# Patient Record
Sex: MALE | Race: WHITE | NOT HISPANIC OR LATINO | Employment: OTHER | URBAN - METROPOLITAN AREA
[De-identification: names, ages, dates, MRNs, and addresses within clinical notes are randomized per-mention and may not be internally consistent; named-entity substitution may affect disease eponyms.]

---

## 2017-01-26 ENCOUNTER — OFFICE VISIT (OUTPATIENT)
Dept: MEDICAL GROUP | Facility: MEDICAL CENTER | Age: 57
End: 2017-01-26
Payer: MEDICARE

## 2017-01-26 ENCOUNTER — TELEPHONE (OUTPATIENT)
Dept: MEDICAL GROUP | Facility: MEDICAL CENTER | Age: 57
End: 2017-01-26

## 2017-01-26 VITALS
DIASTOLIC BLOOD PRESSURE: 76 MMHG | HEIGHT: 71 IN | WEIGHT: 147 LBS | SYSTOLIC BLOOD PRESSURE: 120 MMHG | HEART RATE: 78 BPM | BODY MASS INDEX: 20.58 KG/M2 | OXYGEN SATURATION: 97 % | RESPIRATION RATE: 16 BRPM | TEMPERATURE: 98.8 F

## 2017-01-26 DIAGNOSIS — Z12.11 SCREEN FOR COLON CANCER: ICD-10-CM

## 2017-01-26 DIAGNOSIS — R06.02 SOB (SHORTNESS OF BREATH): ICD-10-CM

## 2017-01-26 DIAGNOSIS — J30.89 ALLERGIC RHINITIS DUE TO OTHER ALLERGIC TRIGGER, UNSPECIFIED RHINITIS SEASONALITY: ICD-10-CM

## 2017-01-26 DIAGNOSIS — M79.675 PAIN OF TOE OF LEFT FOOT: ICD-10-CM

## 2017-01-26 DIAGNOSIS — M54.2 NECK PAIN: ICD-10-CM

## 2017-01-26 PROCEDURE — 4004F PT TOBACCO SCREEN RCVD TLK: CPT | Performed by: NURSE PRACTITIONER

## 2017-01-26 PROCEDURE — 3017F COLORECTAL CA SCREEN DOC REV: CPT | Mod: 1P | Performed by: NURSE PRACTITIONER

## 2017-01-26 PROCEDURE — G8420 CALC BMI NORM PARAMETERS: HCPCS | Performed by: NURSE PRACTITIONER

## 2017-01-26 PROCEDURE — G8484 FLU IMMUNIZE NO ADMIN: HCPCS | Performed by: NURSE PRACTITIONER

## 2017-01-26 PROCEDURE — G8432 DEP SCR NOT DOC, RNG: HCPCS | Performed by: NURSE PRACTITIONER

## 2017-01-26 PROCEDURE — 99214 OFFICE O/P EST MOD 30 MIN: CPT | Performed by: NURSE PRACTITIONER

## 2017-01-26 RX ORDER — HYDROCODONE BITARTRATE AND ACETAMINOPHEN 5; 325 MG/1; MG/1
1 TABLET ORAL EVERY 8 HOURS PRN
Qty: 20 TAB | Refills: 0 | Status: SHIPPED | OUTPATIENT
Start: 2017-01-26 | End: 2018-12-11

## 2017-01-26 RX ORDER — FLUTICASONE PROPIONATE 50 MCG
2 SPRAY, SUSPENSION (ML) NASAL DAILY
Qty: 16 G | Refills: 1 | Status: SHIPPED | OUTPATIENT
Start: 2017-01-26

## 2017-01-26 ASSESSMENT — ENCOUNTER SYMPTOMS
NECK PAIN: 1
SHORTNESS OF BREATH: 1

## 2017-01-26 NOTE — TELEPHONE ENCOUNTER
MEDICATION PRIOR AUTHORIZATION NEEDED:    1. Name of Medication: Dulera    2. Requested By (Name of Pharmacy): [facility] [address] [phone]     3. Is [place] current[place] [facility]    4. What is the name & phone number of the 3rd party payor? Done on cover my meds Reference # [id number]

## 2017-01-26 NOTE — PROGRESS NOTES
Subjective:      Lamin Pickens is a 56 y.o. male who presents with Follow-Up            HPI Lamin Pickens is here today for toe pain as well as refills on medication and referral.      1. Pain of toe of left foot  Patient states he has had pain in his left large toe on and off for quite a while. He states he tried to contact the podiatry office and get an appointment but states it was a long wait and therefore did not make the appointment. Pain tends to come and go. He admits there is a callus on the side of the foot but there is no swelling or redness noted.    2. Allergic rhinitis due to other allergic trigger, unspecified rhinitis seasonality  Patient states he tends to have nasal congestion frequently throughout the years and might have allergies. He is not currently using anything for his nasal congestion or rhinorrhea.    3. SOB (shortness of breath)  Patient would like a refill on his Dulera and albuterol. His shortness of breath is most likely related to his smoking which he refuses to quit. He states the preventative inhaler is helpful.    4. Neck pain  Patient states he has been having back pain on and off mostly on the right side for a few months. He would like to see a physical therapist for this as well as get a refill on Norco. He states he uses the medicines infrequently. He had been on regular narcotic medications over a year ago but now only uses them infrequently.    5. Screen for colon cancer  Patient states he has never did a colonoscopy.    Current Outpatient Prescriptions   Medication Sig Dispense Refill   • hydrocodone-acetaminophen (NORCO) 5-325 MG Tab per tablet Take 1 Tab by mouth every 8 hours as needed. 20 Tab 0   • fluticasone (FLONASE) 50 MCG/ACT nasal spray Spray 2 Sprays in nose every day. 16 g 1   • Mometasone Furo-Formoterol Fum (DULERA) 200-5 MCG/ACT Aerosol Inhale 2 Puffs by mouth 2 Times a Day. 1 Inhaler 5   • albuterol 108 (90 BASE) MCG/ACT Aero Soln inhalation aerosol Inhale 2  "Puffs by mouth every 6 hours as needed for Shortness of Breath. 8.5 g 3     No current facility-administered medications for this visit.     Social History   Substance Use Topics   • Smoking status: Current Every Day Smoker -- 0.50 packs/day     Types: Cigarettes   • Smokeless tobacco: Never Used   • Alcohol Use: 0.0 oz/week     0 Standard drinks or equivalent per week      Comment: very rare     Past Medical History   Diagnosis Date   • Pain    • Chronic back pain      Family History   Problem Relation Age of Onset   • Other Mother      healthy   • Other Father      unknown       Review of Systems   Respiratory: Positive for shortness of breath.    Musculoskeletal: Positive for joint pain and neck pain.   All other systems reviewed and are negative.         Objective:     /76 mmHg  Pulse 78  Temp(Src) 37.1 °C (98.8 °F)  Resp 16  Ht 1.803 m (5' 11\")  Wt 66.679 kg (147 lb)  BMI 20.51 kg/m2  SpO2 97%     Physical Exam   Constitutional: He is oriented to person, place, and time. He appears well-developed and well-nourished. No distress.   HENT:   Head: Normocephalic and atraumatic.   Right Ear: External ear normal.   Left Ear: External ear normal.   Nose: Nose normal.   Mouth/Throat: Oropharynx is clear and moist.   Eyes: Conjunctivae are normal. Right eye exhibits no discharge. Left eye exhibits no discharge.   Neck: Normal range of motion. Neck supple. No tracheal deviation present. No thyromegaly present.   Cardiovascular: Normal rate, regular rhythm and normal heart sounds.    No murmur heard.  Pulmonary/Chest: Effort normal and breath sounds normal. No respiratory distress. He has no wheezes. He has no rales.   Musculoskeletal:        Cervical back: He exhibits decreased range of motion and pain.   Lymphadenopathy:     He has no cervical adenopathy.   Neurological: He is alert and oriented to person, place, and time. Coordination normal.   Skin: Skin is warm and dry. No rash noted. He is not " diaphoretic. No erythema.   No redness or swelling noted of the left large toe although he states it is tender to touch and there is callus and the side of the toe.   Psychiatric: He has a normal mood and affect. His behavior is normal. Judgment and thought content normal.   Nursing note and vitals reviewed.              Assessment/Plan:     1. Pain of toe of left foot    - REFERRAL TO PODIATRY    2. Allergic rhinitis due to other allergic trigger, unspecified rhinitis seasonality    - fluticasone (FLONASE) 50 MCG/ACT nasal spray; Spray 2 Sprays in nose every day.  Dispense: 16 g; Refill: 1    3. SOB (shortness of breath)  I have refilled patient's Dulera and recommend that he have pulmonary function test in the future. I also recommended quitting smoking.  - Mometasone Furo-Formoterol Fum (DULERA) 200-5 MCG/ACT Aerosol; Inhale 2 Puffs by mouth 2 Times a Day.  Dispense: 1 Inhaler; Refill: 5    4. Neck pain  I gave patient a short course of Norco but explained that cannot prescribe this on a regular basis. He will go to physical therapy and if he continues to have problems I recommended a physiatry referral.  - hydrocodone-acetaminophen (NORCO) 5-325 MG Tab per tablet; Take 1 Tab by mouth every 8 hours as needed.  Dispense: 20 Tab; Refill: 0  - REFERRAL TO PHYSICAL THERAPY Reason for Therapy: Eval/Treat/Report    5. Screen for colon cancer    - REFERRAL TO GI FOR COLONOSCOPY

## 2017-01-26 NOTE — MR AVS SNAPSHOT
"        Lamin Pickens   2017 9:40 AM   Office Visit   MRN: [id number]    Department:  [address] [place] [address]   Dept Phone:  [phone]    Description:  Male : 1960   Provider:  JANETH Hoffman           Reason for Visit     Follow-Up           Allergies as of 2017     Allergen Noted Reactions    Nkda [No Known Drug Allergy] 11/10/2007         You were diagnosed with     Pain of toe of left foot   [345265]       Allergic rhinitis due to other allergic trigger, unspecified rhinitis seasonality   [9379420]       SOB (shortness of breath)   [926341]       Neck pain   [142674]       Screen for colon cancer   [[id number]]         Vital Signs     Blood Pressure Pulse Temperature Respirations Height Weight    120/76 mmHg 78 37.1 °C (98.8 °F) 16 1.803 m (5' 11\") 66.679 kg (147 lb)    Body Mass Index Oxygen Saturation Smoking Status             20.51 kg/m2 97% Current Every Day Smoker         Basic Information     Date Of Birth Sex Race Ethnicity Preferred Language    1960 Male White Non- English      Problem List              ICD-10-CM Priority Class Noted - Resolved    Chronic back pain M54.9, G89.29   2016 - Present    Tobacco abuse Z72.0   2016 - Present      Health Maintenance        Date Due Completion Dates    IMM PNEUMOCOCCAL 19-64 (ADULT) MEDIUM RISK SERIES (1 of 1 - PPSV23) 1977 ---    COLONOSCOPY 2008 ---    IMM DTaP/Tdap/Td Vaccine (1 - Tdap) 2010    IMM INFLUENZA (1) 2016 ---            Current Immunizations     TD Vaccine 2010  1:19 PM      Below and/or attached are the medications your provider expects you to take. Review all of your home medications and newly ordered medications with your provider and/or pharmacist. Follow medication instructions as directed by your provider and/or pharmacist. Please keep your medication list with you and share with your provider. Update the information when medications are discontinued, doses are changed, " or new medications (including over-the-counter products) are added; and carry medication information at all times in the event of emergency situations     Allergies:  NKDA - (reactions not documented)               Medications  Valid as of: January 26, 2017 - 12:45 PM    Generic Name Brand Name Tablet Size Instructions for use    Albuterol Sulfate (Aero Soln) albuterol 108 (90 BASE) MCG/ACT Inhale 2 Puffs by mouth every 6 hours as needed for Shortness of Breath.        Fluticasone Propionate (Suspension) FLONASE 50 MCG/ACT Spray 2 Sprays in nose every day.        Hydrocodone-Acetaminophen (Tab) NORCO 5-325 MG Take 1 Tab by mouth every 8 hours as needed.        Mometasone Furo-Formoterol Fum (Aerosol) Mometasone Furo-Formoterol Fum 200-5 MCG/ACT Inhale 2 Puffs by mouth 2 Times a Day.        .                 Medicines prescribed today were sent to:     [id number] - [place], [place] - [address] [place] [address]    Phone: [phone] Fax: [phone]    Open 24 Hours?: No      Medication refill instructions:       If your prescription bottle indicates you have medication refills left, it is not necessary to call your provider’s office. Please contact your pharmacy and they will refill your medication.    If your prescription bottle indicates you do not have any refills left, you may request refills at any time through one of the following ways: The online [employer] system (except Urgent Care), by calling your provider’s office, or by asking your pharmacy to contact your provider’s office with a refill request. Medication refills are processed only during regular business hours and may not be available until the next business day. Your provider may request additional information or to have a follow-up visit with you prior to refilling your medication.   *Please Note: Medication refills are assigned a new Rx number when refilled electronically. Your pharmacy may indicate that no refills  were authorized even though a new prescription for the same medication is available at the pharmacy. Please request the medicine by name with the pharmacy before contacting your provider for a refill.        Referral     A referral request has been sent to our patient care coordination department. Please allow 3-5 business days for us to process this request and contact you either by phone or mail. If you do not hear from us by the 5th business day, please call us at [phone].           Gidsy Access Code: [email or web address]  Expires: 2/10/2017  3:47 PM    Gidsy  A secure, online tool to manage your health information     [employer]’s Gidsy® is a secure, online tool that connects you to your personalized health information from the privacy of your home -- day or night - making it very easy for you to manage your healthcare. Once the activation process is completed, you can even access your medical information using the Gidsy jada, which is available for free in the Apple Jada store or Google Play store.     Gidsy provides the following levels of access (as shown below):   My Chart Features   [facility] Doctor [facility]  Specialists [facility]  Urgent  Care Non-[facility]  Primary Care  Doctor   Email your healthcare team securely and privately 24/7 X X X    Manage appointments: schedule your next appointment; view details of past/upcoming appointments X      Request prescription refills. X      View recent personal medical records, including lab and immunizations X X X X   View health record, including health history, allergies, medications X X X X   Read reports about your outpatient visits, procedures, consult and ER notes X X X X   See your discharge summary, which is a recap of your hospital and/or ER visit that includes your diagnosis, lab results, and care plan. X X       How to register for Gidsy:  1. Go to  [email or web address].  2. Click on the Sign Up Now box, which takes you to the  New Member Sign Up page. You will need to provide the following information:  a. Enter your Anc[employer] Access Code exactly as it appears at the top of this page. (You will not need to use this code after you’ve completed the sign-up process. If you do not sign up before the expiration date, you must request a new code.)   b. Enter your date of birth.   c. Enter your home email address.   d. Click Submit, and follow the next screen’s instructions.  3. Create a Ancanco ID. This will be your Ancanco login ID and cannot be changed, so think of one that is secure and easy to remember.  4. Create a Ancanco password. You can change your password at any time.  5. Enter your Password Reset Question and Answer. This can be used at a later time if you forget your password.   6. Enter your e-mail address. This allows you to receive e-mail notifications when new information is available in Ancanco.  7. Click Sign Up. You can now view your health information.    For assistance activating your Anc[employer] account, call [phone]

## 2017-01-31 ENCOUNTER — TELEPHONE (OUTPATIENT)
Dept: MEDICAL GROUP | Facility: MEDICAL CENTER | Age: 57
End: 2017-01-31

## 2017-01-31 DIAGNOSIS — J43.9 PULMONARY EMPHYSEMA, UNSPECIFIED EMPHYSEMA TYPE (HCC): ICD-10-CM

## 2017-01-31 NOTE — TELEPHONE ENCOUNTER
1. Caller Name: Pharmacy                                            Patient approves a detailed voicemail message: N\A    Patient's insurance did not covered the prescription for Symbicort anymore, they would like you to prescribe pt Breo

## 2017-02-15 ENCOUNTER — APPOINTMENT (OUTPATIENT)
Dept: PHYSICAL THERAPY | Facility: REHABILITATION | Age: 57
End: 2017-02-15
Attending: NURSE PRACTITIONER
Payer: MEDICARE

## 2017-02-20 ENCOUNTER — APPOINTMENT (OUTPATIENT)
Dept: PHYSICAL THERAPY | Facility: REHABILITATION | Age: 57
End: 2017-02-20
Attending: NURSE PRACTITIONER
Payer: MEDICARE

## 2017-02-22 ENCOUNTER — APPOINTMENT (OUTPATIENT)
Dept: PHYSICAL THERAPY | Facility: REHABILITATION | Age: 57
End: 2017-02-22
Attending: NURSE PRACTITIONER
Payer: MEDICARE

## 2017-02-27 ENCOUNTER — APPOINTMENT (OUTPATIENT)
Dept: PHYSICAL THERAPY | Facility: REHABILITATION | Age: 57
End: 2017-02-27
Attending: NURSE PRACTITIONER
Payer: MEDICARE

## 2017-03-01 ENCOUNTER — APPOINTMENT (OUTPATIENT)
Dept: PHYSICAL THERAPY | Facility: REHABILITATION | Age: 57
End: 2017-03-01
Attending: NURSE PRACTITIONER
Payer: MEDICARE

## 2017-03-06 ENCOUNTER — APPOINTMENT (OUTPATIENT)
Dept: PHYSICAL THERAPY | Facility: REHABILITATION | Age: 57
End: 2017-03-06
Attending: NURSE PRACTITIONER
Payer: MEDICARE

## 2017-03-08 ENCOUNTER — APPOINTMENT (OUTPATIENT)
Dept: PHYSICAL THERAPY | Facility: REHABILITATION | Age: 57
End: 2017-03-08
Attending: NURSE PRACTITIONER
Payer: MEDICARE

## 2018-11-26 ENCOUNTER — HOSPITAL ENCOUNTER (EMERGENCY)
Facility: MEDICAL CENTER | Age: 58
End: 2018-11-26
Attending: EMERGENCY MEDICINE
Payer: MEDICARE

## 2018-11-26 ENCOUNTER — HOSPITAL ENCOUNTER (EMERGENCY)
Facility: MEDICAL CENTER | Age: 58
End: 2018-11-26
Payer: MEDICARE

## 2018-11-26 ENCOUNTER — APPOINTMENT (OUTPATIENT)
Dept: RADIOLOGY | Facility: MEDICAL CENTER | Age: 58
End: 2018-11-26
Attending: EMERGENCY MEDICINE
Payer: MEDICARE

## 2018-11-26 VITALS
SYSTOLIC BLOOD PRESSURE: 100 MMHG | WEIGHT: 142 LBS | RESPIRATION RATE: 16 BRPM | HEART RATE: 77 BPM | DIASTOLIC BLOOD PRESSURE: 76 MMHG | BODY MASS INDEX: 19.8 KG/M2 | TEMPERATURE: 100.2 F | OXYGEN SATURATION: 93 %

## 2018-11-26 VITALS
OXYGEN SATURATION: 91 % | DIASTOLIC BLOOD PRESSURE: 90 MMHG | TEMPERATURE: 97.7 F | SYSTOLIC BLOOD PRESSURE: 160 MMHG | RESPIRATION RATE: 18 BRPM | HEART RATE: 88 BPM | HEIGHT: 71 IN | BODY MASS INDEX: 20 KG/M2 | WEIGHT: 142.86 LBS

## 2018-11-26 DIAGNOSIS — J18.9 ATYPICAL PNEUMONIA: ICD-10-CM

## 2018-11-26 PROCEDURE — 302449 STATCHG TRIAGE ONLY (STATISTIC)

## 2018-11-26 PROCEDURE — 700111 HCHG RX REV CODE 636 W/ 250 OVERRIDE (IP): Mod: EDC | Performed by: EMERGENCY MEDICINE

## 2018-11-26 PROCEDURE — 700102 HCHG RX REV CODE 250 W/ 637 OVERRIDE(OP): Mod: EDC | Performed by: EMERGENCY MEDICINE

## 2018-11-26 PROCEDURE — 71046 X-RAY EXAM CHEST 2 VIEWS: CPT

## 2018-11-26 PROCEDURE — 99284 EMERGENCY DEPT VISIT MOD MDM: CPT | Mod: EDC

## 2018-11-26 PROCEDURE — A9270 NON-COVERED ITEM OR SERVICE: HCPCS | Mod: EDC | Performed by: EMERGENCY MEDICINE

## 2018-11-26 RX ORDER — ALBUTEROL SULFATE 90 UG/1
2 AEROSOL, METERED RESPIRATORY (INHALATION) ONCE
Status: COMPLETED | OUTPATIENT
Start: 2018-11-26 | End: 2018-11-26

## 2018-11-26 RX ORDER — AZITHROMYCIN 250 MG/1
TABLET, FILM COATED ORAL
Qty: 6 TAB | Refills: 0 | Status: SHIPPED | OUTPATIENT
Start: 2018-11-26 | End: 2018-12-11

## 2018-11-26 RX ORDER — PREDNISONE 20 MG/1
40 TABLET ORAL DAILY
Qty: 8 TAB | Refills: 0 | Status: SHIPPED | OUTPATIENT
Start: 2018-11-26 | End: 2018-11-26

## 2018-11-26 RX ORDER — PREDNISONE 20 MG/1
60 TABLET ORAL DAILY
Status: DISCONTINUED | OUTPATIENT
Start: 2018-11-26 | End: 2018-11-26 | Stop reason: HOSPADM

## 2018-11-26 RX ORDER — PREDNISONE 20 MG/1
40 TABLET ORAL DAILY
Qty: 8 TAB | Refills: 0 | Status: SHIPPED | OUTPATIENT
Start: 2018-11-26 | End: 2018-11-30

## 2018-11-26 RX ADMIN — ALBUTEROL SULFATE 2 PUFF: 90 AEROSOL, METERED RESPIRATORY (INHALATION) at 02:47

## 2018-11-26 RX ADMIN — PREDNISONE 60 MG: 20 TABLET ORAL at 02:06

## 2018-11-26 ASSESSMENT — PAIN SCALES - GENERAL: PAINLEVEL_OUTOF10: 0

## 2018-11-26 NOTE — ED NOTES
Lamin DECKER/Judith.  Discharge instructions including the importance of hydration, the use of OTC medications, information on community acquired pneumonia and the proper follow up recommendations have been provided to the pt.  Pt states understanding.  Pt states all questions have been answered.  A copy of the discharge instructions have been provided to pt.  A signed copy is in the chart.  Prescription for zithromax and prednisone provided to pt. Discussed worsening symptoms to return to ED, importance of fluids, humidifier for cough, follow up with pcp for recheck.   Pt ambulated out of department in NAD. /76   Pulse 77   Temp 37.9 °C (100.2 °F) (Temporal)   Resp 16   Wt 64.4 kg (142 lb)   SpO2 93%   BMI 19.80 kg/m²

## 2018-11-26 NOTE — ED TRIAGE NOTES
"Chief Complaint   Patient presents with   • Flu Like Symptoms     Patient ambulatory to triage. Patient states that he has \"the flu\". Patient c/o of productive cough, congestion, and chills. Patient states that he is coughing up green mucous. Also reports that he has only been able to eat a tub of ice cream in the past few days. /90   Pulse 88   Temp 36.5 °C (97.7 °F) (Temporal)   Resp 18   Ht 1.803 m (5' 11\")   Wt 64.8 kg (142 lb 13.7 oz)   SpO2 91%   BMI 19.92 kg/m²     "

## 2018-11-26 NOTE — ED NOTES
Patient ambulatory to peds 50 with family.  Triage note reviewed and agreed with.  Patient is awake, alert and appropriate with no obvious S/S of distress or discomfort.  Chart up for ERP.

## 2018-11-26 NOTE — ED NOTES
RT is aware of the need for an albuterol inhaler.  Patient is aware of the need for x-rays.  Will continue to assess.

## 2018-11-26 NOTE — ED TRIAGE NOTES
"Chief Complaint   Patient presents with   • Flu Like Symptoms     Patient ambulatory to triage. Patient states that he has \"the flu\". Patient c/o of productive cough, congestion, and chills. Patient states that he is coughing up green mucous. Also reports that he has only been able to eat a tub of ice cream in the past few days. /90   Pulse 88   Temp 36.5 °C (97.7 °F) (Temporal)   Resp 16   Wt 64.4 kg (142 lb)   SpO2 91%   BMI 19.80 kg/m²       "

## 2018-11-26 NOTE — ED PROVIDER NOTES
ER Provider Note     Scribed for Salty Phelan M.D. by Linda Masterson. 11/26/2018, 1:49 AM.    Primary Care Provider: JANETH Hoffman  Means of Arrival: Walk in   History obtained from: Patient  History limited by: None     CHIEF COMPLAINT  Chief Complaint   Patient presents with   • Flu Like Symptoms       HPI  Lamin Pickens is a 58 y.o. male with history of COPD who presents to the Emergency Department for evaluation of a productive cough with associated green sputum production and nasal congestion onset a few days ago that has gradually worsened in severity. He states he has only been able to tolerate ice cream. The patient has been using an inhaler with mild alleviation. He has been diagnosed with COPD exacerbation in the past, but recently quit smoking 9 days ago. The patient denies fever. No alleviating or exacerbating factors are identified at this time.     REVIEW OF SYSTEMS  See HPI for further details.     PAST MEDICAL HISTORY   has a past medical history of Chronic back pain and Pain.    SURGICAL HISTORY  patient denies any surgical history    SOCIAL HISTORY  Social History   Substance Use Topics   • Smoking status: Former Smoker     Packs/day: 0.50     Types: Cigarettes   • Smokeless tobacco: Never Used   • Alcohol use 0.0 oz/week      Comment: very rare      History   Drug Use No       FAMILY HISTORY  Family History   Problem Relation Age of Onset   • Other Mother         healthy   • Other Father         unknown       CURRENT MEDICATIONS  Home Medications     Reviewed by Delphine Hinojosa R.N. (Registered Nurse) on 11/26/18 at 0121  Med List Status: Partial   Medication Last Dose Status   albuterol 108 (90 BASE) MCG/ACT Aero Soln inhalation aerosol  Active   fluticasone (FLONASE) 50 MCG/ACT nasal spray  Active   Fluticasone Furoate-Vilanterol (BREO ELLIPTA) 200-25 MCG/INH AEROSOL POWDER, BREATH ACTIVATED  Active   hydrocodone-acetaminophen (NORCO) 5-325 MG Tab per tablet  Active   Mometasone  Furo-Formoterol Fum (DULERA) 200-5 MCG/ACT Aerosol  Active                ALLERGIES  Allergies   Allergen Reactions   • Nkda [No Known Drug Allergy]        PHYSICAL EXAM  VITAL SIGNS: /90   Pulse 88   Temp 36.5 °C (97.7 °F) (Temporal)   Resp 16   Wt 64.4 kg (142 lb)   SpO2 91%   BMI 19.80 kg/m²      Constitutional: Alert in no apparent distress.  HENT: Normocephalic, Atraumatic, Bilateral external ears normal. Nose normal.   Eyes: Pupils are equal and reactive. Conjunctiva normal, non-icteric.   Heart: Regular rate and rythm, no murmurs.    Lungs: Scant wheezing throughout, occasional rhonchi. Wet cough.   Skin: Warm, Dry, No erythema, No rash.   Neurologic: Alert, Grossly non-focal.   Psychiatric: Affect normal, Judgment normal, Mood normal, Appears appropriate and not intoxicated.     RADIOLOGY  DX-CHEST-2 VIEWS   Final Result      No radiographic evidence of acute cardiopulmonary process.        The radiologist's interpretation of all radiological studies have been reviewed by me.    COURSE & MEDICAL DECISION MAKING  Pertinent Labs & Imaging studies reviewed. (See chart for details)    This is a 58 y.o. male that presents with what appears to be a COPD exacerbation.  The patient is not in extremis at this time.  I will give him an albuterol inhaler treatment as well as given steroids.  I get a chest x-ray to evaluate for pneumonia and then reassess.  I do not believe the patient is having acute coronary syndrome.      1:49 AM - Patient seen and examined at bedside. Ordered DX-Chest.  Patient will be medicated with Deltasone 60 mg and Albuterol Inhaler 2 puffs for his symptoms.     3:35 AM - Reviewed patient's radiology results as shown above.     3:36 AM - Patient reevaluated. The patient is resting comfortably with stable vital signs. He was updated with radiology results that are reassuring with no focal pneumonia.  I believe this is likely an atypical pneumonia.. Patient informed he will be  discharged home with prescriptions for Zithromax and Deltasone. He is understanding and agreeable to discharge at this time.     The patient will return for new or worsening symptoms and is stable at the time of discharge.    DISPOSITION:  Patient will be discharged home in stable condition.    FOLLOW UP:  JANETH Hoffman  [address] [facility] [address]  [place] [address]  [phone]            OUTPATIENT MEDICATIONS:  New Prescriptions    AZITHROMYCIN (ZITHROMAX) 250 MG TAB    Take two tabs by mouth on day one, then one tab by mouth daily on days 2-5.    PREDNISONE (DELTASONE) 20 MG TAB    Take 2 Tabs by mouth every day for 4 days.       FINAL IMPRESSION  1. Atypical pneumonia          Linda LENTZ (Nori), am scribing for, and in the presence of, Salty Phelan M.D..    Electronically signed by: Linda Arteaga), 11/26/2018    Salty LENTZ M.D. personally performed the services described in this documentation, as scribed by Linda Masterson in my presence, and it is both accurate and complete.     E.    The note accurately reflects work and decisions made by me.  Salty Phelan  11/26/2018  4:53 AM

## 2018-12-11 ENCOUNTER — OFFICE VISIT (OUTPATIENT)
Dept: MEDICAL GROUP | Facility: MEDICAL CENTER | Age: 58
End: 2018-12-11
Payer: MEDICARE

## 2018-12-11 VITALS
DIASTOLIC BLOOD PRESSURE: 70 MMHG | SYSTOLIC BLOOD PRESSURE: 104 MMHG | HEART RATE: 70 BPM | TEMPERATURE: 98.7 F | WEIGHT: 143 LBS | OXYGEN SATURATION: 98 % | BODY MASS INDEX: 20.02 KG/M2 | HEIGHT: 71 IN | RESPIRATION RATE: 16 BRPM

## 2018-12-11 DIAGNOSIS — J44.1 COPD EXACERBATION (HCC): ICD-10-CM

## 2018-12-11 DIAGNOSIS — Z12.5 SCREENING FOR PROSTATE CANCER: ICD-10-CM

## 2018-12-11 DIAGNOSIS — Z72.0 TOBACCO ABUSE: ICD-10-CM

## 2018-12-11 DIAGNOSIS — R53.83 FATIGUE, UNSPECIFIED TYPE: ICD-10-CM

## 2018-12-11 DIAGNOSIS — J43.9 PULMONARY EMPHYSEMA, UNSPECIFIED EMPHYSEMA TYPE (HCC): ICD-10-CM

## 2018-12-11 DIAGNOSIS — Z23 INFLUENZA VACCINE NEEDED: ICD-10-CM

## 2018-12-11 DIAGNOSIS — Z13.220 SCREENING CHOLESTEROL LEVEL: ICD-10-CM

## 2018-12-11 PROCEDURE — 99214 OFFICE O/P EST MOD 30 MIN: CPT | Mod: 25 | Performed by: NURSE PRACTITIONER

## 2018-12-11 PROCEDURE — G0008 ADMIN INFLUENZA VIRUS VAC: HCPCS | Performed by: NURSE PRACTITIONER

## 2018-12-11 PROCEDURE — 90686 IIV4 VACC NO PRSV 0.5 ML IM: CPT | Performed by: NURSE PRACTITIONER

## 2018-12-11 RX ORDER — TIOTROPIUM BROMIDE 18 UG/1
18 CAPSULE ORAL; RESPIRATORY (INHALATION) DAILY
Qty: 30 CAP | Refills: 3 | Status: SHIPPED | OUTPATIENT
Start: 2018-12-11

## 2018-12-11 ASSESSMENT — ENCOUNTER SYMPTOMS
SPUTUM PRODUCTION: 1
SHORTNESS OF BREATH: 1
COUGH: 1

## 2018-12-11 ASSESSMENT — PATIENT HEALTH QUESTIONNAIRE - PHQ9: CLINICAL INTERPRETATION OF PHQ2 SCORE: 0

## 2018-12-11 NOTE — PROGRESS NOTES
Subjective:      Lamin Pickens is a 58 y.o. male who presents with Follow-Up (woudl like labwork lossing weight)        CC: Patient is here today accompanied by his 2 sons for emergency room follow-up with COPD exacerbation as well as issues with fatigue and breathing.  Patient's last office visit was January 2017.    HPI Lamin Pickens        1. COPD exacerbation (HCC)  Patient was seen at the emergency room for complaints of shortness of breath and green sputum.  His subsequent chest x-ray was normal with stable hyperinflation but he was treated for possible community-acquired pneumonia with steroids and antibiotics.  He states he still has some issues with shortness of breath for which he finds relief with albuterol.  He did quit smoking about 25 days ago but smoked for many years.    2. Pulmonary emphysema, unspecified emphysema type (HCC)  Patient currently only using albuterol because he states the preventative inhalers that were prescribed in the past were too expensive.  He also was smoking cigarettes up until recently.  He did have a pulmonary function test in September 2016 which suggested moderate obstructive lung disease with possibility of restrictive lung disease.    3. Fatigue, unspecified type  Patient states he tends to feel more tired than usual for the past few months.  He also thinks he has been losing weight although looking at his weight over the last 2 years shows he has fluctuated in the 140s range and does not show excessive weight loss.  He has not done lab work since 2016 at which time he had a normal chemistry panel and CBC.      4. Tobacco abuse  Patient continues to abstain from tobacco for the last 25 days but was a long-term tobacco user.  He has COPD but no evidence of infection or masses on his recent chest x-ray.    5. Screening for prostate cancer  Patient overdue for PSA testing with his last test in 2016.    6. Screening cholesterol level  Last cholesterol testing was in 2016 and was  "good.    7. Influenza vaccine needed  Patient due for this season's flu vaccine.  Current Outpatient Prescriptions   Medication Sig Dispense Refill   • tiotropium (SPIRIVA) 18 MCG Cap Inhale 1 Cap by mouth every day. 30 Cap 3   • albuterol 108 (90 BASE) MCG/ACT Aero Soln inhalation aerosol Inhale 2 Puffs by mouth every 6 hours as needed for Shortness of Breath. 8.5 g 3   • fluticasone (FLONASE) 50 MCG/ACT nasal spray Spray 2 Sprays in nose every day. 16 g 1     No current facility-administered medications for this visit.      Social History   Substance Use Topics   • Smoking status: Former Smoker     Packs/day: 0.50     Types: Cigarettes   • Smokeless tobacco: Never Used   • Alcohol use 0.0 oz/week      Comment: very rare     Family History   Problem Relation Age of Onset   • Other Mother         healthy   • Other Father         unknown     Past Medical History:   Diagnosis Date   • Chronic back pain    • Pain        Review of Systems   Constitutional: Positive for malaise/fatigue.   Respiratory: Positive for cough, sputum production and shortness of breath.    All other systems reviewed and are negative.         Objective:     /70 (BP Location: Right arm, Patient Position: Sitting, BP Cuff Size: Adult)   Pulse 70   Temp 37.1 °C (98.7 °F) (Temporal)   Resp 16   Ht 1.803 m (5' 11\")   Wt 64.9 kg (143 lb)   SpO2 98%   BMI 19.94 kg/m²      Physical Exam   Constitutional: He is oriented to person, place, and time. He appears well-developed and well-nourished. No distress.   HENT:   Head: Normocephalic and atraumatic.   Right Ear: External ear normal.   Left Ear: External ear normal.   Nose: Nose normal.   Mouth/Throat: Oropharynx is clear and moist.   Eyes: Conjunctivae are normal. Right eye exhibits no discharge. Left eye exhibits no discharge.   Neck: Normal range of motion. Neck supple. No tracheal deviation present. No thyromegaly present.   Cardiovascular: Normal rate, regular rhythm and normal heart " sounds.    No murmur heard.  Pulmonary/Chest: Effort normal. No respiratory distress. He has decreased breath sounds. He has no wheezes. He has no rales.   Lymphadenopathy:     He has no cervical adenopathy.   Neurological: He is alert and oriented to person, place, and time. Coordination normal.   Skin: Skin is warm and dry. No rash noted. He is not diaphoretic. No erythema.   Psychiatric: He has a normal mood and affect. His behavior is normal. Judgment and thought content normal.   Nursing note and vitals reviewed.              Assessment/Plan:     1. COPD exacerbation (HCC)  Patient recently treated for COPD exacerbation but continues to complain of expectoration of phlegm and shortness of breath despite antibiotics and steroids.  I would like to get a CT of the chest and he will be referred to pulmonology.  - CT-CHEST (THORAX) W/O; Future  - REFERRAL TO PULMONOLOGY    2. Pulmonary emphysema, unspecified emphysema type ([facility])  Last pulmonary function test was in 2016 showing moderate disease.  I would like to get a repeat PFT and I explained that he needs to be on preventative inhalers as well.  I will try him on Spiriva to start and hopefully can afford this.  He has quit smoking.  - PULMONARY FUNCTION TESTS -Test requested: Complete Pulmonary Function Test; Future  - tiotropium (SPIRIVA) 18 MCG Cap; Inhale 1 Cap by mouth every day.  Dispense: 30 Cap; Refill: 3  - REFERRAL TO PULMONOLOGY    3. Fatigue, unspecified type  This could be multifactorial including secondary to his emphysema but I will check thyroid, anemia and a chemistry panel.  He is complaining of weight loss but I only see that he has lost a few pounds over the last few years.  I will get a CT of the chest because of his long history of smoking.  I did talk to him about a CT of the abdomen and pelvis as well but he is concerned about cost.  He did have a CT of the abdomen and pelvis in 2016 which was normal and he is not having any complaints of  pain in the abdomen.  - COMP METABOLIC PANEL; Future  - CBC WITH DIFFERENTIAL; Future  - TSH; Future    4. Tobacco abuse  Patient is now 25 days off tobacco and feels motivated to remain smoke-free.    5. Screening for prostate cancer  Patient reminded that should be done yearly.  - PROSTATE SPECIFIC AG SCREENING; Future    6. Screening cholesterol level  Patient advised on yearly follow-up visits and lab work.  - Lipid Profile; Future    7. Influenza vaccine needed  I have placed the below orders and discussed them with an approved delegating provider. The MA is performing the below orders under the direction of Dr. Brown    - Flu Quad Inj >3 Year Pre-Filled PF

## 2019-01-02 ENCOUNTER — HOSPITAL ENCOUNTER (OUTPATIENT)
Dept: LAB | Facility: MEDICAL CENTER | Age: 59
End: 2019-01-02
Attending: NURSE PRACTITIONER
Payer: MEDICARE

## 2019-01-02 DIAGNOSIS — Z13.220 SCREENING CHOLESTEROL LEVEL: ICD-10-CM

## 2019-01-02 DIAGNOSIS — R53.83 FATIGUE, UNSPECIFIED TYPE: ICD-10-CM

## 2019-01-02 DIAGNOSIS — Z12.5 SCREENING FOR PROSTATE CANCER: ICD-10-CM

## 2019-01-02 LAB
ALBUMIN SERPL BCP-MCNC: 4.9 G/DL (ref 3.2–4.9)
ALBUMIN/GLOB SERPL: 2 G/DL
ALP SERPL-CCNC: 72 U/L (ref 30–99)
ALT SERPL-CCNC: 15 U/L (ref 2–50)
ANION GAP SERPL CALC-SCNC: 8 MMOL/L (ref 0–11.9)
AST SERPL-CCNC: 21 U/L (ref 12–45)
BASOPHILS # BLD AUTO: 0.7 % (ref 0–1.8)
BASOPHILS # BLD: 0.04 K/UL (ref 0–0.12)
BILIRUB SERPL-MCNC: 0.9 MG/DL (ref 0.1–1.5)
BUN SERPL-MCNC: 18 MG/DL (ref 8–22)
CALCIUM SERPL-MCNC: 9.6 MG/DL (ref 8.5–10.5)
CHLORIDE SERPL-SCNC: 105 MMOL/L (ref 96–112)
CHOLEST SERPL-MCNC: 140 MG/DL (ref 100–199)
CO2 SERPL-SCNC: 29 MMOL/L (ref 20–33)
CREAT SERPL-MCNC: 1 MG/DL (ref 0.5–1.4)
EOSINOPHIL # BLD AUTO: 0.09 K/UL (ref 0–0.51)
EOSINOPHIL NFR BLD: 1.5 % (ref 0–6.9)
ERYTHROCYTE [DISTWIDTH] IN BLOOD BY AUTOMATED COUNT: 43.8 FL (ref 35.9–50)
FASTING STATUS PATIENT QL REPORTED: NORMAL
GLOBULIN SER CALC-MCNC: 2.5 G/DL (ref 1.9–3.5)
GLUCOSE SERPL-MCNC: 83 MG/DL (ref 65–99)
HCT VFR BLD AUTO: 47.7 % (ref 42–52)
HDLC SERPL-MCNC: 60 MG/DL
HGB BLD-MCNC: 15.9 G/DL (ref 14–18)
IMM GRANULOCYTES # BLD AUTO: 0.01 K/UL (ref 0–0.11)
IMM GRANULOCYTES NFR BLD AUTO: 0.2 % (ref 0–0.9)
LDLC SERPL CALC-MCNC: 60 MG/DL
LYMPHOCYTES # BLD AUTO: 1.68 K/UL (ref 1–4.8)
LYMPHOCYTES NFR BLD: 28.6 % (ref 22–41)
MCH RBC QN AUTO: 29.7 PG (ref 27–33)
MCHC RBC AUTO-ENTMCNC: 33.3 G/DL (ref 33.7–35.3)
MCV RBC AUTO: 89.2 FL (ref 81.4–97.8)
MONOCYTES # BLD AUTO: 0.46 K/UL (ref 0–0.85)
MONOCYTES NFR BLD AUTO: 7.8 % (ref 0–13.4)
NEUTROPHILS # BLD AUTO: 3.6 K/UL (ref 1.82–7.42)
NEUTROPHILS NFR BLD: 61.2 % (ref 44–72)
NRBC # BLD AUTO: 0 K/UL
NRBC BLD-RTO: 0 /100 WBC
PLATELET # BLD AUTO: 254 K/UL (ref 164–446)
PMV BLD AUTO: 9.2 FL (ref 9–12.9)
POTASSIUM SERPL-SCNC: 4 MMOL/L (ref 3.6–5.5)
PROT SERPL-MCNC: 7.4 G/DL (ref 6–8.2)
PSA SERPL-MCNC: 0.66 NG/ML (ref 0–4)
RBC # BLD AUTO: 5.35 M/UL (ref 4.7–6.1)
SODIUM SERPL-SCNC: 142 MMOL/L (ref 135–145)
TRIGL SERPL-MCNC: 101 MG/DL (ref 0–149)
TSH SERPL DL<=0.005 MIU/L-ACNC: 1.01 UIU/ML (ref 0.38–5.33)
WBC # BLD AUTO: 5.9 K/UL (ref 4.8–10.8)

## 2019-01-02 PROCEDURE — 84153 ASSAY OF PSA TOTAL: CPT | Mod: GZ

## 2019-01-02 PROCEDURE — 80053 COMPREHEN METABOLIC PANEL: CPT

## 2019-01-02 PROCEDURE — 80061 LIPID PANEL: CPT | Mod: GZ

## 2019-01-02 PROCEDURE — 36415 COLL VENOUS BLD VENIPUNCTURE: CPT

## 2019-01-02 PROCEDURE — 84443 ASSAY THYROID STIM HORMONE: CPT

## 2019-01-02 PROCEDURE — 85025 COMPLETE CBC W/AUTO DIFF WBC: CPT

## 2021-03-15 DIAGNOSIS — Z23 NEED FOR VACCINATION: ICD-10-CM
